# Patient Record
Sex: MALE | Race: WHITE | NOT HISPANIC OR LATINO | Employment: FULL TIME | URBAN - METROPOLITAN AREA
[De-identification: names, ages, dates, MRNs, and addresses within clinical notes are randomized per-mention and may not be internally consistent; named-entity substitution may affect disease eponyms.]

---

## 2021-12-18 ENCOUNTER — OFFICE VISIT (OUTPATIENT)
Dept: URGENT CARE | Facility: URGENT CARE | Age: 26
End: 2021-12-18
Payer: COMMERCIAL

## 2021-12-18 ENCOUNTER — ANCILLARY PROCEDURE (OUTPATIENT)
Dept: GENERAL RADIOLOGY | Facility: CLINIC | Age: 26
End: 2021-12-18
Payer: COMMERCIAL

## 2021-12-18 VITALS
OXYGEN SATURATION: 99 % | SYSTOLIC BLOOD PRESSURE: 128 MMHG | WEIGHT: 175 LBS | RESPIRATION RATE: 18 BRPM | DIASTOLIC BLOOD PRESSURE: 77 MMHG | BODY MASS INDEX: 25.92 KG/M2 | HEART RATE: 96 BPM | TEMPERATURE: 98.5 F | HEIGHT: 69 IN

## 2021-12-18 DIAGNOSIS — R09.1 PLEURITIS: ICD-10-CM

## 2021-12-18 DIAGNOSIS — M94.0 COSTOCHONDRITIS: ICD-10-CM

## 2021-12-18 DIAGNOSIS — R09.1 PLEURITIS: Primary | ICD-10-CM

## 2021-12-18 LAB — D DIMER PPP FEU-MCNC: <0.27 UG/ML FEU (ref 0–0.5)

## 2021-12-18 PROCEDURE — 36415 COLL VENOUS BLD VENIPUNCTURE: CPT | Performed by: FAMILY MEDICINE

## 2021-12-18 PROCEDURE — 85379 FIBRIN DEGRADATION QUANT: CPT | Performed by: FAMILY MEDICINE

## 2021-12-18 PROCEDURE — 99204 OFFICE O/P NEW MOD 45 MIN: CPT | Performed by: FAMILY MEDICINE

## 2021-12-18 PROCEDURE — 71046 X-RAY EXAM CHEST 2 VIEWS: CPT | Performed by: RADIOLOGY

## 2021-12-18 ASSESSMENT — MIFFLIN-ST. JEOR: SCORE: 1769.17

## 2021-12-18 NOTE — PROGRESS NOTES
"URGENT CARE NOTE    Assessment/Plan:    Pleuritis  Costochondritis  Chest x-ray benign and D-dimer negative.  Unlikely to be significant pathology with these.  Most likely costochondritis given tenderness to palpation.  Recommended symptomatic cares and rest.  - XR Chest 2 Views  - D dimer, quantitative  - D dimer, quantitative      Virginia Mora MD  [id number], Family Medicine      Ordering of each unique test       Stew Lockhart is a 26 year old male with a PMH of   There is no problem list on file for this patient.    presenting to clinic today with a chief complaint of:    Patient presents with:  Urgent Care: pt. thinks my have a puncture lung or broken rib. pt coughed up blood this morning while brushing teeth.   Chest Pain: SOB started today.     Had a cold last week with a lot of coughing. Coughing has resolved. Was negative for covid. This morning started getting a sharp pain in the left anterior mid-chest whenever he takes a deep breath. Very related to breathing, doesn't hurt when not taking a deep breath. Coughed up blood this morning.  Patient was concerned that he may be broke a rib.  He has not have any history of traumas.    No current outpatient medications on file.       O: /77   Pulse 96   Temp 98.5  F (36.9  C) (Oral)   Resp 18   Ht 1.753 m (5' 9\")   Wt 79.4 kg (175 lb)   SpO2 99%   BMI 25.84 kg/m     Gen:  Well nourished and in no acute distress.   HEENT: PERRL;  nasopharynx pink and moist; oropharynx pink and moist  Neck: supple without lymphadenopathy  Chest wall: grossly normal to inspection. Mildly tender to palpation over anterior mid-chest on L.   CV:  RRR  - no murmurs noted   Pulm:  CTAB, no wheezes or crackles noted, good air entry. No increased work of breathing. No tachypnea.   Extrem: no cyanosis, edema or clubbing  Psych: Euthymic     Your most recent lab results (some earlier results may not be listed):  Results for orders placed or performed in visit on " 12/18/21   XR Chest 2 Views     Status: None    Narrative    EXAM: XR CHEST 2 VW  LOCATION: [facility]  DATE/TIME: 12/18/2021 5:14 PM    INDICATION:  Pleuritis  COMPARISON: None.      Impression    IMPRESSION: Negative chest.   Results for orders placed or performed in visit on 12/18/21   D dimer, quantitative     Status: Normal   Result Value Ref Range    D-Dimer Quantitative <0.27 0.00 - 0.50 ug/mL FEU    Narrative    This D-dimer assay is intended for use in conjunction with a clinical pretest probability assessment model to exclude pulmonary embolism (PE) and deep venous thrombosis (DVT) in outpatients suspected of PE or DVT. The cut-off value is 0.50 ug/mL FEU.       This note was created using Dragon dictation system. Typos are not intentional.

## 2022-03-11 ENCOUNTER — OFFICE VISIT (OUTPATIENT)
Dept: FAMILY MEDICINE | Facility: CLINIC | Age: 27
End: 2022-03-11
Payer: COMMERCIAL

## 2022-03-11 VITALS
DIASTOLIC BLOOD PRESSURE: 79 MMHG | RESPIRATION RATE: 12 BRPM | SYSTOLIC BLOOD PRESSURE: 120 MMHG | TEMPERATURE: 99.5 F | OXYGEN SATURATION: 98 % | HEART RATE: 87 BPM

## 2022-03-11 DIAGNOSIS — M79.10 MYALGIA: ICD-10-CM

## 2022-03-11 DIAGNOSIS — J06.9 VIRAL URI WITH COUGH: Primary | ICD-10-CM

## 2022-03-11 DIAGNOSIS — R05.9 COUGH: ICD-10-CM

## 2022-03-11 PROBLEM — F33.2 SEVERE EPISODE OF RECURRENT MAJOR DEPRESSIVE DISORDER, WITHOUT PSYCHOTIC FEATURES (H): Status: ACTIVE | Noted: 2021-12-27

## 2022-03-11 PROBLEM — F41.1 GENERALIZED ANXIETY DISORDER: Status: ACTIVE | Noted: 2021-12-27

## 2022-03-11 LAB
FLUAV AG SPEC QL IA: NEGATIVE
FLUBV AG SPEC QL IA: NEGATIVE

## 2022-03-11 PROCEDURE — 99213 OFFICE O/P EST LOW 20 MIN: CPT | Performed by: NURSE PRACTITIONER

## 2022-03-11 PROCEDURE — 87804 INFLUENZA ASSAY W/OPTIC: CPT | Performed by: NURSE PRACTITIONER

## 2022-03-11 PROCEDURE — U0003 INFECTIOUS AGENT DETECTION BY NUCLEIC ACID (DNA OR RNA); SEVERE ACUTE RESPIRATORY SYNDROME CORONAVIRUS 2 (SARS-COV-2) (CORONAVIRUS DISEASE [COVID-19]), AMPLIFIED PROBE TECHNIQUE, MAKING USE OF HIGH THROUGHPUT TECHNOLOGIES AS DESCRIBED BY CMS-2020-01-R: HCPCS | Performed by: NURSE PRACTITIONER

## 2022-03-11 PROCEDURE — U0005 INFEC AGEN DETEC AMPLI PROBE: HCPCS | Performed by: NURSE PRACTITIONER

## 2022-03-11 RX ORDER — ESCITALOPRAM OXALATE 10 MG/1
TABLET ORAL
COMMUNITY
Start: 2021-12-27

## 2022-03-11 ASSESSMENT — ENCOUNTER SYMPTOMS: SHORTNESS OF BREATH: 0

## 2022-03-12 LAB — SARS-COV-2 RNA RESP QL NAA+PROBE: NEGATIVE

## 2022-03-12 NOTE — PROGRESS NOTES
Assessment & Plan     Cough    - Symptomatic; Yes; 3/10/2022 COVID-19 Virus (Coronavirus) by PCR Nose  - XR Chest 2 Views  - Influenza A & B Antigen - Clinic Collect  - XR Chest 2 Views    Myalgia    - Symptomatic; Yes; 3/10/2022 COVID-19 Virus (Coronavirus) by PCR Nose  - XR Chest 2 Views  - Influenza A & B Antigen - Clinic Collect  - XR Chest 2 Views    Viral URI with cough         Focused exam and history done due to COVID-19 pandemic in a walk-in setting.      History, exam, and vital signs consistent with a viral URI.  Patient is well-appearing in the clinic with normal vital signs, no respiratory distress and afebrile.  Due to potential for double sickening based on history, did do x-ray which was negative for pneumonia.  Given that the x-ray was negative, this seems to be a new illness rather than a worsening of the first illness.     No red flags for severe illness.    Isolate pending covid results.  Patient states he is staying alone at this time.    Recheck if shortness of breath or new fevers develop.  Rest.     OTCs recommended: None [   ].  Dextromethorphan  [ x ], guaifenesin [ x ], pseudoephedrine [ x  ], Afrin for no greater than 3 days [ x ], Tylenol or ibuprofen [ x ].                Return in about 1 week (around 3/18/2022) for If no better.    Raegan Jameson, [facility]    Brennon Mathias is a 26 year old male who presents to clinic today for the following health issues:  Chief Complaint   Patient presents with     Cough     nasal congestion, mucus, ear pain, bodyaches, chills, fever, SOB, vomitting      HPI    Had cough and runny nose x 1 week and then suddenly yesterday morning got worse with green nasal drainage (green), plugged, earache, bodyaches, chills, vomiting x 1 yesterday.      Hasn't had covid vaccines.  Had covid at onset.          Review of Systems   HENT: Positive for ear pain.    Respiratory: Negative for shortness of breath.             Objective    /79 (BP Location: Right arm, Patient Position: Sitting, Cuff Size: Adult Regular)   Pulse 87   Temp 99.5  F (37.5  C) (Oral)   Resp 12   SpO2 98%   Physical Exam  Constitutional:       Appearance: He is well-developed.   HENT:      Right Ear: Tympanic membrane and external ear normal.      Left Ear: Tympanic membrane and external ear normal.      Nose: Congestion and rhinorrhea present.      Comments: Cannot breathe through nose on either side.     Mouth/Throat:      Pharynx: Posterior oropharyngeal erythema present.      Tonsils: No tonsillar exudate or tonsillar abscesses. 1+ on the right. 1+ on the left.   Eyes:      General:         Right eye: No discharge.         Left eye: No discharge.      Conjunctiva/sclera: Conjunctivae normal.   Cardiovascular:      Rate and Rhythm: Normal rate and regular rhythm.      Pulses: Normal pulses.      Heart sounds: Normal heart sounds.   Pulmonary:      Effort: Pulmonary effort is normal.      Breath sounds: Normal breath sounds. No wheezing.   Musculoskeletal:         General: Normal range of motion.   Skin:     General: Skin is warm.      Capillary Refill: Capillary refill takes less than 2 seconds.   Neurological:      Mental Status: He is alert and oriented to person, place, and time.   Psychiatric:         Mood and Affect: Mood normal.         Behavior: Behavior normal.         Thought Content: Thought content normal.         Judgment: Judgment normal.            Results for orders placed or performed in visit on 03/11/22 (from the past 24 hour(s))   Influenza A & B Antigen - Clinic Collect    Specimen: Nasopharyngeal; Swab   Result Value Ref Range    Influenza A antigen Negative Negative    Influenza B antigen Negative Negative    Narrative    Test results must be correlated with clinical data. If necessary, results should be confirmed by a molecular assay or viral culture.   XR Chest 2 Views    Narrative    EXAM DATE:         03/11/2022    EXAM:  X-RAY CHEST, 2 VIEWS, FRONTAL AND LATERAL  LOCATION: [facility]  DATE/TIME: 3/11/2022 6:50 PM    INDICATION: Pain.  COMPARISON: 02/18/2021.    IMPRESSION: Negative chest. Lungs are clear. Normal heart size.

## 2022-03-12 NOTE — PATIENT INSTRUCTIONS
"Isolate pending the results of your Covid test.  [facility] if [facility] have not to get your results in email or on your phone.  X-ray was normal.  Flu was negative.    I am concerned that this was Covid.    Please check over-the-counter cough medications for the ingredients.  There are numerous combinations.  Recommended over-the-counter ingredients for you include:      [  x ] Acetaminophen (tylenol) or [   ] ibuprofen (motrin, advil) to help with pain and fever.  Do not use Tylenol if it is already in the cough medicine that you are going to use.   [ x  ] Dextromethorphan-this is a cough suppressant  [ x  ] Guaifenesin-this will thin mucus to help with congestion. (Dayquil and Nyquil)   [ x  ] Oxymetazoline nasal spray (Afrin): This will reduce the congestion in your nose.  Do not use longer than 3 days.   [x] Sudafed     Other things you can try include a humidifier, tea or warm water with 1 teaspoon of honey, propping yourself up to reduce drainage.    If you have high blood pressure or heart disease, only take over-the-counter cough medicines that say \"HBP\" for \"high blood pressure.\" (eg. Coricidin HBP) These medicines do not raise your blood pressure.  Do not take Sudafed/pseudoephedrine.      If you have any questions about whether or not a particular medication is okay for your situation, please ask the pharmacist at your pharmacy.    Recheck if your Covid test is negative and you continue to feel much worse over the next few days.,  Sooner with shortness of breath or high fevers.        "

## 2022-04-03 ENCOUNTER — HEALTH MAINTENANCE LETTER (OUTPATIENT)
Age: 27
End: 2022-04-03

## 2022-10-03 ENCOUNTER — HEALTH MAINTENANCE LETTER (OUTPATIENT)
Age: 27
End: 2022-10-03

## 2023-05-20 ENCOUNTER — HEALTH MAINTENANCE LETTER (OUTPATIENT)
Age: 28
End: 2023-05-20

## 2024-07-27 ENCOUNTER — HEALTH MAINTENANCE LETTER (OUTPATIENT)
Age: 29
End: 2024-07-27